# Patient Record
Sex: FEMALE | Race: BLACK OR AFRICAN AMERICAN | NOT HISPANIC OR LATINO | URBAN - METROPOLITAN AREA
[De-identification: names, ages, dates, MRNs, and addresses within clinical notes are randomized per-mention and may not be internally consistent; named-entity substitution may affect disease eponyms.]

---

## 2021-01-11 ENCOUNTER — EMERGENCY (EMERGENCY)
Facility: HOSPITAL | Age: 41
LOS: 1 days | Discharge: HOME | End: 2021-01-11
Attending: EMERGENCY MEDICINE | Admitting: EMERGENCY MEDICINE
Payer: OTHER MISCELLANEOUS

## 2021-01-11 VITALS
OXYGEN SATURATION: 99 % | HEIGHT: 65 IN | DIASTOLIC BLOOD PRESSURE: 96 MMHG | SYSTOLIC BLOOD PRESSURE: 138 MMHG | WEIGHT: 149.91 LBS | TEMPERATURE: 98 F | RESPIRATION RATE: 17 BRPM | HEART RATE: 78 BPM
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
## 2021-01-11 DIAGNOSIS — S60.211A CONTUSION OF RIGHT WRIST, INITIAL ENCOUNTER: ICD-10-CM

## 2021-01-11 DIAGNOSIS — S80.01XA CONTUSION OF RIGHT KNEE, INITIAL ENCOUNTER: ICD-10-CM
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
## 2021-01-11 DIAGNOSIS — M25.511 PAIN IN RIGHT SHOULDER: ICD-10-CM
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
## 2021-01-11 DIAGNOSIS — M25.561 PAIN IN RIGHT KNEE: ICD-10-CM
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
## 2021-01-11 PROCEDURE — 73130 X-RAY EXAM OF HAND: CPT | Mod: 26,RT

## 2021-01-11 PROCEDURE — 73030 X-RAY EXAM OF SHOULDER: CPT | Mod: 26,RT

## 2021-01-11 PROCEDURE — 73110 X-RAY EXAM OF WRIST: CPT | Mod: 26,RT

## 2021-01-11 PROCEDURE — 99284 EMERGENCY DEPT VISIT MOD MDM: CPT | Mod: 25

## 2021-01-11 PROCEDURE — 73562 X-RAY EXAM OF KNEE 3: CPT | Mod: 26,RT

## 2021-01-11 RX ORDER — OXYCODONE AND ACETAMINOPHEN 5; 325 MG/1; MG/1
1 TABLET ORAL
Qty: 12 | Refills: 0
Start: 2021-01-11 | End: 2021-01-13

## 2021-01-11 RX ORDER — IBUPROFEN 200 MG
1 TABLET ORAL
Qty: 20 | Refills: 0
Start: 2021-01-11 | End: 2021-01-15

## 2021-01-11 RX ORDER — KETOROLAC TROMETHAMINE 30 MG/ML
30 SYRINGE (ML) INJECTION ONCE
Refills: 0 | Status: DISCONTINUED | OUTPATIENT
Start: 2021-01-11 | End: 2021-01-11

## 2021-01-11 RX ORDER — OXYCODONE AND ACETAMINOPHEN 5; 325 MG/1; MG/1
1 TABLET ORAL ONCE
Refills: 0 | Status: DISCONTINUED | OUTPATIENT
Start: 2021-01-11 | End: 2021-01-11

## 2021-01-11 RX ADMIN — Medication 30 MILLIGRAM(S): at 11:06

## 2021-01-11 RX ADMIN — OXYCODONE AND ACETAMINOPHEN 1 TABLET(S): 5; 325 TABLET ORAL at 11:06

## 2021-01-11 NOTE — ED PROVIDER NOTE - PROVIDER TOKENS
PROVIDER:[TOKEN:[[id number]]],PROVIDER:[TOKEN:[[id number]]],PROVIDER:[TOKEN:[[id number]]]

## 2021-01-11 NOTE — ED PROVIDER NOTE - PROGRESS NOTE DETAILS
evaluated xrays, no report available at time of dc. Since pt is tender in dorsum of hand/wrist, placed in velcro thumb spica to RUE, ace wrap for R knee. Will prescribe motrin/percocet, recommend rest for 3 days. Social Manager explained possibilities of follow up for workers comp.

## 2021-01-11 NOTE — ED ADULT NURSE NOTE - OBJECTIVE STATEMENT
pt. s/p fall when trying to carry aa heavy item at work, pain to the right side of the body mainly the right forearm, wrist and knee. Denies hitting her head or LOC

## 2021-01-11 NOTE — ED PROVIDER NOTE - MUSCULOSKELETAL, MLM
R wrist and hand dorsum tenderness, no deformity, +swelling over dorsum of R hand, decreased rom hand due to pain, R shoulder mild tenderness, FROM, R knee swelling moderate w limited rom due to pain

## 2021-01-11 NOTE — ED PROVIDER NOTE - OBJECTIVE STATEMENT
40 female pt, hx of HIV (undetectable VL), on biktarvy, DM on Metformin, presents after a fall at work. fell towards her R side and now has pain in R knee, R wrist/hand and shoulder. Pain more pronounced in R hand and wrist. R hand dominant. no head trauma, no LOC, no neck pain, no back pain. no numbness, no paresthesias.

## 2021-01-11 NOTE — ED PROVIDER NOTE - PATIENT PORTAL LINK FT
You can access the FollowMyHealth Patient Portal offered by [facility] by registering at the following website: [email or web address]. By joining [employer]’s FollowMyHealth portal, you will also be able to view your health information using other applications (apps) compatible with our system.

## 2021-01-11 NOTE — ED PROVIDER NOTE - CARE PROVIDERS DIRECT ADDRESSES
[email or web address]_Unknown

## 2021-01-11 NOTE — ED PROVIDER NOTE - CLINICAL SUMMARY MEDICAL DECISION MAKING FREE TEXT BOX
Pt S/P fall w R hand/wrist, shoulder and knee pain. will get xrays, provide analgesia. R/O hand/wrist fx.

## 2021-01-11 NOTE — ED PROVIDER NOTE - CARE PROVIDER_API CALL
Wes Stratton)  Orthopaedic Surgery  [address] [place] [address]  Phone: [phone]  Fax: [phone]  Follow Up Time:     Diego Finnegan)  [facility]  Orthopedics  [address] [place] [address]  Phone: [phone]  Fax: [phone]  Follow Up Time:     Juan A Garcia)  Orthopaedic Surgery  [address] [place] [address], [facility] [address]  [place] [address]  Phone: [phone]  Fax: [phone]  Follow Up Time:

## 2021-01-17 ENCOUNTER — EMERGENCY (EMERGENCY)
Facility: HOSPITAL | Age: 41
LOS: 1 days | Discharge: HOME | End: 2021-01-17
Admitting: EMERGENCY MEDICINE
Payer: OTHER MISCELLANEOUS

## 2021-01-17 VITALS
RESPIRATION RATE: 18 BRPM | SYSTOLIC BLOOD PRESSURE: 114 MMHG | WEIGHT: 149.91 LBS | OXYGEN SATURATION: 97 % | TEMPERATURE: 98 F | HEIGHT: 60 IN | DIASTOLIC BLOOD PRESSURE: 76 MMHG | HEART RATE: 84 BPM

## 2021-01-17 DIAGNOSIS — M54.6 PAIN IN THORACIC SPINE: ICD-10-CM

## 2021-01-17 DIAGNOSIS — M25.531 PAIN IN RIGHT WRIST: ICD-10-CM

## 2021-01-17 PROCEDURE — 99282 EMERGENCY DEPT VISIT SF MDM: CPT

## 2021-01-17 NOTE — ED PROVIDER NOTE - CARE PROVIDER_API CALL
Allen Yung)  PhysicalRehab Medicine  [address] [place] [address]  Phone: [phone]  Fax: [phone]  Follow Up Time:     Kathy Monterroso)  PhysicalRehab Medicine  [address] [place] [address]  Phone: [phone]  Fax: [phone]  Follow Up Time:

## 2021-01-17 NOTE — ED PROVIDER NOTE - CLINICAL SUMMARY MEDICAL DECISION MAKING FREE TEXT BOX
patient with persistant R wrist pain after work injury one week ago. xray done at that time WNL. advised continued use of splint, follow up with PCP and PM&R.

## 2021-01-17 NOTE — ED PROVIDER NOTE - PHYSICAL EXAMINATION
FROM, R hand mild edema over dorsum, diffusely tender. no point spinal tenderness, no step off. TTP along R side of upper back and shoulder

## 2021-01-17 NOTE — ED ADULT TRIAGE NOTE - CHIEF COMPLAINT QUOTE
reports going to work yesterday, lifted garbage and right hand became more swollen. took 400mg Advil at 5am. was seen on 1/11 for a fall with associated injuries and pain. was told to come into the ED for worsening pain/swelling. would like another work note.

## 2021-01-17 NOTE — ED PROVIDER NOTE - OBJECTIVE STATEMENT
PMHX HIV on biktarvy, PMHX HIV on biktarvy, DM, RHD presents with worseneing R hand pain and swelling after lifitng at work yesterday PMHX HIV on biktarvy, DM, RHD presents with worseneing R hand pain and swelling after lifitng at work yesterday. also admits to R sided back pain that started 2 days after injury. was seen in this ED one week ago for work injury. had xrays done which were normal. denies focal weakness, paresthesias. taking ibuprofen 400mg for pain

## 2021-01-17 NOTE — ED PROVIDER NOTE - PATIENT PORTAL LINK FT
You can access the FollowMyHealth Patient Portal offered by [facility] by registering at the following website: [email or web address]. By joining [employer]’s FollowMyHealth portal, you will also be able to view your health information using other applications (apps) compatible with our system.

## 2021-01-17 NOTE — ED PROVIDER NOTE - CARE PROVIDERS DIRECT ADDRESSES
[email or web address]

## 2021-03-09 NOTE — ED PROVIDER NOTE - WR INTERPRETATION 3
MSK XR negative - No fracture, No dislocation, No foreign body, cannot R/O scaphoid fx 10-Mar-2021 02:53